# Patient Record
(demographics unavailable — no encounter records)

---

## 2025-07-03 NOTE — DISCUSSION/SUMMARY
[FreeTextEntry1] : CP, atypical check a stress echo if able to approve and schedule. Recommend a primary care evaluation as well.

## 2025-07-03 NOTE — REASON FOR VISIT
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 Patient denies any radiation of pain. Patient states when the chest pain intensifies he felt diaphoretic and nauseous but but denies any nausea at this time. Patient has not vomited. Patient states that the pain started when he was driving to [place]. Chest pain is nonreproducible. Denies any leg swelling or pain. Denies any shortness of breath. Patient reports he went for a run today and normally is heart rate on runs is around 170 but went up to 200 which concerned him. Patient states that his aunt  of a cardiac issue when she was 20 years old. Patient denies any hormonal use, recent surgeries, recent trauma. Patient no history of blood clots. Patient has not taken anything for his pain. EKG reviewed, normal sinus rhythm. Labs, chest x-ray, reeval.